# Patient Record
Sex: FEMALE | Race: WHITE | NOT HISPANIC OR LATINO | URBAN - METROPOLITAN AREA
[De-identification: names, ages, dates, MRNs, and addresses within clinical notes are randomized per-mention and may not be internally consistent; named-entity substitution may affect disease eponyms.]

---

## 2023-12-04 ENCOUNTER — ANCILLARY PROCEDURE (OUTPATIENT)
Dept: RADIOLOGY | Facility: CLINIC | Age: 18
End: 2023-12-04
Payer: COMMERCIAL

## 2023-12-04 ENCOUNTER — OFFICE VISIT (OUTPATIENT)
Dept: PRIMARY CARE | Facility: CLINIC | Age: 18
End: 2023-12-04
Payer: COMMERCIAL

## 2023-12-04 VITALS — SYSTOLIC BLOOD PRESSURE: 114 MMHG | DIASTOLIC BLOOD PRESSURE: 68 MMHG | WEIGHT: 150 LBS | HEART RATE: 78 BPM

## 2023-12-04 DIAGNOSIS — M25.561 RIGHT MEDIAL KNEE PAIN: Primary | ICD-10-CM

## 2023-12-04 DIAGNOSIS — M25.561 RIGHT MEDIAL KNEE PAIN: ICD-10-CM

## 2023-12-04 PROCEDURE — 73562 X-RAY EXAM OF KNEE 3: CPT | Mod: RIGHT SIDE | Performed by: RADIOLOGY

## 2023-12-04 PROCEDURE — 99203 OFFICE O/P NEW LOW 30 MIN: CPT | Performed by: NURSE PRACTITIONER

## 2023-12-04 PROCEDURE — 73562 X-RAY EXAM OF KNEE 3: CPT | Mod: RT,FY

## 2023-12-04 PROCEDURE — 1036F TOBACCO NON-USER: CPT | Performed by: NURSE PRACTITIONER

## 2023-12-04 ASSESSMENT — PAIN SCALES - GENERAL: PAINLEVEL: 4

## 2023-12-04 NOTE — PROGRESS NOTES
Subjective   Patient ID: Ree Diehl is a 18 y.o. female who presents for No chief complaint on file..      Review of Systems   Constitutional:  Negative for chills, fever and unexpected weight change.   Respiratory: Negative.     Cardiovascular: Negative.    Musculoskeletal:         Per CC       Objective   Physical Exam  Vitals reviewed.   Constitutional:       Appearance: Normal appearance.   Cardiovascular:      Rate and Rhythm: Normal rate and regular rhythm.   Pulmonary:      Effort: Pulmonary effort is normal.      Breath sounds: Normal breath sounds.   Musculoskeletal:      Cervical back: Neck supple.      Comments: Tender medial with eversion, full extension.   No  swelling, effusion. No crepitance on extension.  Films today likely PT referral based on findings   Neurological:      Mental Status: She is alert.         Assessment/Plan   Diagnoses and all orders for this visit:  Right medial knee pain  -     XR knee right 3 views; Future

## 2023-12-31 ASSESSMENT — ENCOUNTER SYMPTOMS
CHILLS: 0
RESPIRATORY NEGATIVE: 1
CARDIOVASCULAR NEGATIVE: 1
FEVER: 0
UNEXPECTED WEIGHT CHANGE: 0

## 2024-01-08 ENCOUNTER — EVALUATION (OUTPATIENT)
Dept: PHYSICAL THERAPY | Facility: CLINIC | Age: 19
End: 2024-01-08
Payer: COMMERCIAL

## 2024-01-08 DIAGNOSIS — G89.29 CHRONIC PAIN OF RIGHT KNEE: ICD-10-CM

## 2024-01-08 DIAGNOSIS — M25.561 CHRONIC PAIN OF RIGHT KNEE: ICD-10-CM

## 2024-01-08 DIAGNOSIS — M25.561 RIGHT MEDIAL KNEE PAIN: Primary | ICD-10-CM

## 2024-01-08 PROCEDURE — 97110 THERAPEUTIC EXERCISES: CPT | Mod: GP | Performed by: PHYSICAL THERAPIST

## 2024-01-08 PROCEDURE — 97161 PT EVAL LOW COMPLEX 20 MIN: CPT | Mod: GP | Performed by: PHYSICAL THERAPIST

## 2024-01-08 ASSESSMENT — PAIN - FUNCTIONAL ASSESSMENT: PAIN_FUNCTIONAL_ASSESSMENT: 0-10

## 2024-01-08 ASSESSMENT — PAIN SCALES - GENERAL: PAINLEVEL_OUTOF10: 2
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
## 2024-01-16 ENCOUNTER — TREATMENT (OUTPATIENT)
Dept: PHYSICAL THERAPY | Facility: CLINIC | Age: 19
End: 2024-01-16
Payer: COMMERCIAL

## 2024-01-16 DIAGNOSIS — M25.561 CHRONIC PAIN OF RIGHT KNEE: Primary | ICD-10-CM

## 2024-01-16 DIAGNOSIS — G89.29 CHRONIC PAIN OF RIGHT KNEE: Primary | ICD-10-CM

## 2024-01-16 PROCEDURE — 97110 THERAPEUTIC EXERCISES: CPT | Mod: GP,CQ

## 2024-01-16 PROCEDURE — 97140 MANUAL THERAPY 1/> REGIONS: CPT | Mod: CQ,GP

## 2024-01-16 ASSESSMENT — PAIN - FUNCTIONAL ASSESSMENT: PAIN_FUNCTIONAL_ASSESSMENT: 0-10

## 2024-01-16 ASSESSMENT — PAIN SCALES - GENERAL: PAINLEVEL_OUTOF10: 3

## 2024-01-16 NOTE — PROGRESS NOTES
"      Physical Therapy Treatment    Patient Name: Ree Diehl  MRN: [id number]  Today's Date: 1/16/2024     Insurance   Visit #: 2  Visits approved: 40  Certification dates: NA     Current Problem:  1. Chronic pain of right knee            Subjective:  My knee feels a little more sore today, I think due to the weather   Pain:  Pain Assessment: 0-10  Pain Score: 3    Objective:  AROM R knee  8-0-141  Precautions  Precautions  Precautions Comment: None. Low fall risk.  Treatments:  Therapeutic Exercise 03272: Unit(s)-2  Bike seat 6 x 5min Manual L1  TG Leg Press B LE level 4 SDR 5 1x10  Standing Gastroc stretch \"runner's\" x 30sec R/L  Seated Hamstring NWB stretch x 30 sec on edge of plinth   SLR with QS R/Lx10  Supine Bridge x10  SL Clamshell x 10 R/L  Reviewed HEP  Neuromuscular Re-education 59428: Unit(s)  SLS on Airex R/L x 15 sec each x3  Manual Therapy 90351: Unit(s) -1  GR III Patellar Mobs to R knee  Inf/sup glide 3x10  Kinesio tape for support to R patella  Assessment: Decreased LE stability observed with increased dynamic valgus with SLS R>L  Plan: Continue 1x week, work on LE stability .  "

## 2024-01-26 ENCOUNTER — TREATMENT (OUTPATIENT)
Dept: PHYSICAL THERAPY | Facility: CLINIC | Age: 19
End: 2024-01-26
Payer: COMMERCIAL

## 2024-01-26 DIAGNOSIS — M25.561 RIGHT MEDIAL KNEE PAIN: ICD-10-CM

## 2024-01-26 DIAGNOSIS — G89.29 CHRONIC PAIN OF RIGHT KNEE: Primary | ICD-10-CM

## 2024-01-26 DIAGNOSIS — M25.561 CHRONIC PAIN OF RIGHT KNEE: Primary | ICD-10-CM

## 2024-01-26 PROCEDURE — 97110 THERAPEUTIC EXERCISES: CPT | Mod: GP,CQ

## 2024-01-26 PROCEDURE — 97140 MANUAL THERAPY 1/> REGIONS: CPT | Mod: CQ,GP

## 2024-01-26 ASSESSMENT — PAIN SCALES - GENERAL: PAINLEVEL_OUTOF10: 2

## 2024-01-26 ASSESSMENT — PAIN - FUNCTIONAL ASSESSMENT: PAIN_FUNCTIONAL_ASSESSMENT: 0-10

## 2024-01-26 NOTE — PROGRESS NOTES
"      Physical Therapy Treatment    Patient Name: Ree Diehl  MRN: [id number]  Today's Date: 1/26/2024  Time Calculation  Start Time: 1447  Stop Time: 1533  Time Calculation (min): 46 min  Insurance   Visit #: 2  Visits approved: 40  Certification dates: NA     Current Problem:  1. Chronic pain of right knee        2. Right medial knee pain  Follow Up In Physical Therapy          Subjective:  My knee feels a little more sore today, I think due to the weather   Pain:  Pain Assessment: 0-10  Pain Score: 2    Objective:  AROM R knee  8-0-141  Precautions  Precautions  Precautions Comment: None. Low fall risk.  Treatments:  Therapeutic Exercise 42838: Unit(s)-2  Bike seat 6 x 5min Manual L1  TG Leg Press B LE level 5 SDR 5 2x10  Standing Gastroc stretch \"runner's\" x 30sec R/L  Seated Hamstring NWB stretch x 30 sec on edge of chair  SLS with contralateral hip abd/er isometric x 5 x5\" hold R/L  Lateral step up 2\" block 1x10 R/L  SLR with QS R/Lx10  Supine Bridge  with hip abd isometric with blue band x10  SL Hip abd 1x10  Reviewed HEP  Neuromuscular Re-education 86148: Unit(s)  Manual Therapy 25582: Unit(s) -1  GR III Patellar Mobs to R knee  Inf/sup glide 3x10  Kinesio tape for support to R patella  Tiger tail to R quad, hamstring and calf   Assessment: Patient able to perform exercise session with some slight discomfort noted.  Patient has increased dysfunction with WB activities that require increased knee flexion.   Plan: Continue 1x week, work on LE stability  and core strength  "

## 2024-01-30 ENCOUNTER — TREATMENT (OUTPATIENT)
Dept: PHYSICAL THERAPY | Facility: CLINIC | Age: 19
End: 2024-01-30
Payer: COMMERCIAL

## 2024-01-30 DIAGNOSIS — M25.561 RIGHT MEDIAL KNEE PAIN: ICD-10-CM

## 2024-01-30 DIAGNOSIS — M25.561 CHRONIC PAIN OF RIGHT KNEE: Primary | ICD-10-CM

## 2024-01-30 DIAGNOSIS — G89.29 CHRONIC PAIN OF RIGHT KNEE: Primary | ICD-10-CM

## 2024-01-30 PROCEDURE — 97140 MANUAL THERAPY 1/> REGIONS: CPT | Mod: CQ,GP

## 2024-01-30 PROCEDURE — 97110 THERAPEUTIC EXERCISES: CPT | Mod: GP,CQ

## 2024-01-30 ASSESSMENT — PAIN - FUNCTIONAL ASSESSMENT: PAIN_FUNCTIONAL_ASSESSMENT: 0-10

## 2024-01-30 ASSESSMENT — PAIN SCALES - GENERAL: PAINLEVEL_OUTOF10: 2

## 2024-01-30 NOTE — PROGRESS NOTES
"      Physical Therapy Treatment    Patient Name: Ree Diehl  MRN: [id number]  Today's Date: 1/30/2024  Time Calculation  Start Time: 1000  Stop Time: 1045  Time Calculation (min): 45 min  Insurance   Visit #: 4  Visits approved: 40  Certification dates: NA     Current Problem:  1. Chronic pain of right knee            Subjective: I feel ok today.  I felt ok after last session and the tape continues to help.  Pain:  Pain Assessment: 0-10  Pain Score: 2    Objective:  AROM R knee  Not tested  Precautions  Precautions  Precautions Comment: None. Low fall risk.  Treatments:  Therapeutic Exercise 71773: Unit(s)-2  Bike seat 6 x 5min Manual L1  TG Leg Press B LE level 5 SDR 5 2x15  TG Eccentric Quad R/L 2x10 Level 3 SDR 6  Standing Gastroc stretch \"runner's\" x 30sec R/L  Seated Hamstring NWB stretch x 30 sec on edge of chair  SLS with contralateral hip abd/er isometric x 5 x5\" hold R/L  Lateral step up 2\" block 1x10 R/L  Supine Hipflexor/Quad stretch in ja test position x 30sec   SLR with QS R/Lx10  SL Hip abd 1x15  Supine Active Hamstring x 10 R/L  Reviewed HEP  Neuromuscular Re-education 66455: Unit(s)  Manual Therapy 97487: Unit(s) -1  GR III Patellar Mobs to R knee  Inf/sup glide 3x10  Kinesio tape for support to R patella  Tiger tail to R quad, hamstring and calf   Assessment: Patient returns with continued decrease of symptoms with KT to patella and with exercises.  Plan: Continue 1-2x week, work on LE SLS control.   "

## 2024-02-06 ENCOUNTER — TREATMENT (OUTPATIENT)
Dept: PHYSICAL THERAPY | Facility: CLINIC | Age: 19
End: 2024-02-06
Payer: COMMERCIAL

## 2024-02-06 DIAGNOSIS — M25.561 ACUTE PAIN OF RIGHT KNEE: Primary | ICD-10-CM

## 2024-02-06 DIAGNOSIS — M25.561 RIGHT MEDIAL KNEE PAIN: ICD-10-CM

## 2024-02-06 PROCEDURE — 97110 THERAPEUTIC EXERCISES: CPT | Mod: GP | Performed by: PHYSICAL THERAPIST

## 2024-02-06 NOTE — PROGRESS NOTES
Physical Therapy Follow-up    Patient Name: Ree Diehl  MRN: [id number]  Today's Date: 2/6/2024         Visit#: 5. 40 approved.  Cert dates: NA    Precautions: None.    Subjective: 0/10 resting pain today. Easier to walk. Thinks she is ~75% better.    Objective: Squat: wnl w/o pain. PROM KF: wnl including OP.     Treatment:   Therapeutic exercise: ASLR, clam shells, quad stretch in Osmel stretch position- all demo'd correctly.  2-leg bridge.  Alternating 1-leg bridge- Hep 20x, 2x/day.   Reverse lunge- HEP 10x, 2x/day.   SLS on BOSU.  2-way toe tap on BOSU.  1-step reverse on BOSU dome.   2-leg squat on TG #7 x10.   1-leg squat on TG #7 x10.    Assessment: Good valgus control with therex. Demo's HEP correctly.    Plan: Incr LE stab exer.